# Patient Record
Sex: MALE | URBAN - METROPOLITAN AREA
[De-identification: names, ages, dates, MRNs, and addresses within clinical notes are randomized per-mention and may not be internally consistent; named-entity substitution may affect disease eponyms.]

---

## 2021-08-23 ENCOUNTER — TELEPHONE (OUTPATIENT)
Dept: ONCOLOGY | Facility: CLINIC | Age: 26
End: 2021-08-23

## 2021-08-23 NOTE — TELEPHONE ENCOUNTER
Sterling was inquiring about a genetic counseling referral sent by [facility]. Registration information will be sent by fax per Sterling. He will call back with insurance info to schedule -  appt for 10/7/21 at 11:15am with Toni TOLEDO put on hold pending return call from Sterling today.